# Patient Record
Sex: FEMALE | Race: BLACK OR AFRICAN AMERICAN | Employment: FULL TIME
[De-identification: names, ages, dates, MRNs, and addresses within clinical notes are randomized per-mention and may not be internally consistent; named-entity substitution may affect disease eponyms.]

---

## 2024-02-08 ENCOUNTER — HOSPITAL ENCOUNTER (EMERGENCY)
Facility: HOSPITAL | Age: 39
Discharge: HOME | End: 2024-02-08
Attending: EMERGENCY MEDICINE
Payer: OTHER GOVERNMENT

## 2024-02-08 VITALS
HEART RATE: 60 BPM | WEIGHT: 160 LBS | DIASTOLIC BLOOD PRESSURE: 65 MMHG | TEMPERATURE: 98.1 F | BODY MASS INDEX: 26.66 KG/M2 | RESPIRATION RATE: 20 BRPM | SYSTOLIC BLOOD PRESSURE: 116 MMHG | HEIGHT: 65 IN | OXYGEN SATURATION: 100 %

## 2024-02-08 DIAGNOSIS — R10.31 RIGHT LOWER QUADRANT ABDOMINAL PAIN: Primary | ICD-10-CM

## 2024-02-08 LAB
ALBUMIN SERPL-MCNC: 3.3 G/DL (ref 3.4–5)
ALBUMIN/GLOB SERPL: 0.8 (ref 0.8–1.7)
ALP SERPL-CCNC: 69 U/L (ref 45–117)
ALT SERPL-CCNC: 24 U/L (ref 13–56)
ANION GAP SERPL CALC-SCNC: 5 MMOL/L (ref 3–18)
APPEARANCE UR: ABNORMAL
AST SERPL-CCNC: 19 U/L (ref 10–38)
BASOPHILS # BLD: 0 K/UL (ref 0–0.1)
BASOPHILS NFR BLD: 0 % (ref 0–2)
BILIRUB SERPL-MCNC: 0.3 MG/DL (ref 0.2–1)
BILIRUB UR QL: NEGATIVE
BUN SERPL-MCNC: 5 MG/DL (ref 7–18)
BUN/CREAT SERPL: 8 (ref 12–20)
CALCIUM SERPL-MCNC: 8.3 MG/DL (ref 8.5–10.1)
CHLORIDE SERPL-SCNC: 108 MMOL/L (ref 100–111)
CO2 SERPL-SCNC: 22 MMOL/L (ref 21–32)
COLOR UR: YELLOW
CREAT SERPL-MCNC: 0.66 MG/DL (ref 0.6–1.3)
DIFFERENTIAL METHOD BLD: ABNORMAL
EOSINOPHIL # BLD: 0 K/UL (ref 0–0.4)
EOSINOPHIL NFR BLD: 0 % (ref 0–5)
ERYTHROCYTE [DISTWIDTH] IN BLOOD BY AUTOMATED COUNT: 17.9 % (ref 11.6–14.5)
GLOBULIN SER CALC-MCNC: 4.3 G/DL (ref 2–4)
GLUCOSE SERPL-MCNC: 120 MG/DL (ref 74–99)
GLUCOSE UR STRIP.AUTO-MCNC: NEGATIVE MG/DL
HCG SERPL QL: NEGATIVE
HCT VFR BLD AUTO: 30.1 % (ref 35–45)
HGB BLD-MCNC: 8.6 G/DL (ref 12–16)
HGB UR QL STRIP: NEGATIVE
IMM GRANULOCYTES # BLD AUTO: 0 K/UL (ref 0–0.04)
IMM GRANULOCYTES NFR BLD AUTO: 0 % (ref 0–0.5)
KETONES UR QL STRIP.AUTO: 15 MG/DL
LEUKOCYTE ESTERASE UR QL STRIP.AUTO: NEGATIVE
LIPASE SERPL-CCNC: 37 U/L (ref 13–75)
LYMPHOCYTES # BLD: 0.6 K/UL (ref 0.9–3.6)
LYMPHOCYTES NFR BLD: 7 % (ref 21–52)
MCH RBC QN AUTO: 18.9 PG (ref 24–34)
MCHC RBC AUTO-ENTMCNC: 28.6 G/DL (ref 31–37)
MCV RBC AUTO: 66.3 FL (ref 78–100)
MONOCYTES # BLD: 0.3 K/UL (ref 0.05–1.2)
MONOCYTES NFR BLD: 4 % (ref 3–10)
NEUTS SEG # BLD: 7.2 K/UL (ref 1.8–8)
NEUTS SEG NFR BLD: 89 % (ref 40–73)
NITRITE UR QL STRIP.AUTO: NEGATIVE
NRBC # BLD: 0 K/UL (ref 0–0.01)
NRBC BLD-RTO: 0 PER 100 WBC
PH UR STRIP: 7 (ref 5–8)
PLATELET # BLD AUTO: 278 K/UL (ref 135–420)
POTASSIUM SERPL-SCNC: 4 MMOL/L (ref 3.5–5.5)
PROT SERPL-MCNC: 7.6 G/DL (ref 6.4–8.2)
PROT UR STRIP-MCNC: NEGATIVE MG/DL
RBC # BLD AUTO: 4.54 M/UL (ref 4.2–5.3)
SODIUM SERPL-SCNC: 135 MMOL/L (ref 136–145)
SP GR UR REFRACTOMETRY: 1.02 (ref 1–1.03)
UROBILINOGEN UR QL STRIP.AUTO: 0.2 EU/DL (ref 0.2–1)
WBC # BLD AUTO: 8.1 K/UL (ref 4.6–13.2)

## 2024-02-08 PROCEDURE — 84703 CHORIONIC GONADOTROPIN ASSAY: CPT

## 2024-02-08 PROCEDURE — 99284 EMERGENCY DEPT VISIT MOD MDM: CPT

## 2024-02-08 PROCEDURE — 80048 BASIC METABOLIC PNL TOTAL CA: CPT

## 2024-02-08 PROCEDURE — 80076 HEPATIC FUNCTION PANEL: CPT

## 2024-02-08 PROCEDURE — 96375 TX/PRO/DX INJ NEW DRUG ADDON: CPT

## 2024-02-08 PROCEDURE — 81003 URINALYSIS AUTO W/O SCOPE: CPT

## 2024-02-08 PROCEDURE — 6360000002 HC RX W HCPCS: Performed by: EMERGENCY MEDICINE

## 2024-02-08 PROCEDURE — 96374 THER/PROPH/DIAG INJ IV PUSH: CPT

## 2024-02-08 PROCEDURE — 83690 ASSAY OF LIPASE: CPT

## 2024-02-08 PROCEDURE — 85025 COMPLETE CBC W/AUTO DIFF WBC: CPT

## 2024-02-08 RX ORDER — MORPHINE SULFATE 2 MG/ML
2 INJECTION, SOLUTION INTRAMUSCULAR; INTRAVENOUS
Status: COMPLETED | OUTPATIENT
Start: 2024-02-08 | End: 2024-02-08

## 2024-02-08 RX ORDER — FLUOXETINE HYDROCHLORIDE 20 MG/1
20 CAPSULE ORAL DAILY
COMMUNITY

## 2024-02-08 RX ORDER — TRAMADOL HYDROCHLORIDE 50 MG/1
50 TABLET ORAL EVERY 6 HOURS PRN
Qty: 12 TABLET | Refills: 0 | Status: SHIPPED | OUTPATIENT
Start: 2024-02-08 | End: 2024-02-13

## 2024-02-08 RX ORDER — DOCUSATE SODIUM 100 MG/1
100 CAPSULE, LIQUID FILLED ORAL 2 TIMES DAILY
Qty: 60 CAPSULE | Refills: 0 | Status: SHIPPED | OUTPATIENT
Start: 2024-02-08 | End: 2024-03-09

## 2024-02-08 RX ORDER — ONDANSETRON 2 MG/ML
4 INJECTION INTRAMUSCULAR; INTRAVENOUS
Status: COMPLETED | OUTPATIENT
Start: 2024-02-08 | End: 2024-02-08

## 2024-02-08 RX ORDER — KETOROLAC TROMETHAMINE 15 MG/ML
15 INJECTION, SOLUTION INTRAMUSCULAR; INTRAVENOUS EVERY 6 HOURS PRN
Status: DISCONTINUED | OUTPATIENT
Start: 2024-02-08 | End: 2024-02-09 | Stop reason: HOSPADM

## 2024-02-08 RX ADMIN — MORPHINE SULFATE 2 MG: 2 INJECTION, SOLUTION INTRAMUSCULAR; INTRAVENOUS at 20:57

## 2024-02-08 RX ADMIN — KETOROLAC TROMETHAMINE 15 MG: 15 INJECTION, SOLUTION INTRAMUSCULAR; INTRAVENOUS at 20:57

## 2024-02-08 RX ADMIN — ONDANSETRON 4 MG: 2 INJECTION INTRAMUSCULAR; INTRAVENOUS at 20:57

## 2024-02-08 ASSESSMENT — LIFESTYLE VARIABLES
HOW MANY STANDARD DRINKS CONTAINING ALCOHOL DO YOU HAVE ON A TYPICAL DAY: PATIENT DOES NOT DRINK
HOW OFTEN DO YOU HAVE A DRINK CONTAINING ALCOHOL: NEVER

## 2024-02-08 ASSESSMENT — ENCOUNTER SYMPTOMS
VOMITING: 1
ABDOMINAL PAIN: 1
RESPIRATORY NEGATIVE: 1
NAUSEA: 1

## 2024-02-08 ASSESSMENT — PAIN SCALES - GENERAL
PAINLEVEL_OUTOF10: 4
PAINLEVEL_OUTOF10: 8

## 2024-02-08 ASSESSMENT — PAIN - FUNCTIONAL ASSESSMENT: PAIN_FUNCTIONAL_ASSESSMENT: 0-10

## 2024-02-09 NOTE — ED PROVIDER NOTES
examination and evaluation of the patient abdomen was normal.  No pain with palpation of the abdomen.    Patient had repeat lab test done in ER which was completely normal.  Patient was treated with low-dose morphine and Toradol.    Upon re-evaluation the patient's symptoms have improved. Pt has non-toxic appearance and condition is stable for discharge. Patient was informed of tests & results, instructed to f/u with PCP and return to the ED upon worsening of symptoms. All questions and concerns were addressed.       Procedures    FINAL IMPRESSION      Abdominal pain of unknown etiology  Early constipation    DISPOSITION/PLAN     DISPOSITION  DC home.  Follow-up PCP in 2 to 3 days.  Return ER needed    DISCHARGE MEDICATIONS:    Colace    Continue Bentyl     PATIENT REFERRED TO:  GI Dr. Vizcaino    (Please note that portions of this note were completed with a voice recognitionprogram.  Efforts were made to edit the dictations but occasionally words are mis-transcribed.)    Ben Castro MD(electronically signed)  Attending Emergency Physician          Ben Castro MD  02/09/24 [id number]       Ben Castro MD  02/09/24 [id number]

## 2024-02-09 NOTE — ED TRIAGE NOTES
RLQ pain that radiates to right lower back since this am. Was at [facility] ED today and had labs/CT all negative. Was sent home on pain medication and pain never improved. +NV.

## 2024-02-09 NOTE — ED NOTES
Patient received discharge instructions, left ED in stable condition. Ambulatory, steady gait noted.

## 2024-12-27 ENCOUNTER — HOSPITAL ENCOUNTER (OUTPATIENT)
Facility: HOSPITAL | Age: 39
Setting detail: RECURRING SERIES
Discharge: HOME | End: 2024-12-30
Payer: OTHER GOVERNMENT

## 2024-12-27 PROCEDURE — 97110 THERAPEUTIC EXERCISES: CPT

## 2024-12-27 PROCEDURE — 97112 NEUROMUSCULAR REEDUCATION: CPT

## 2024-12-27 PROCEDURE — 97162 PT EVAL MOD COMPLEX 30 MIN: CPT

## 2024-12-27 NOTE — PROGRESS NOTES
[facility]  [address] [facility] [address] [place] [address] Ph:[phone] Fx: [phone]    PLAN OF CARE/ Statement of Necessity for Physical Therapy Services           Patient name: Jane Werner Start of Care: 2024   Referral source: Whitney Chapa MD : 1985    Medical Diagnosis: Pain in right hip [M25.551]  Pain in left hip [M25.552]       Onset Date: 2022   Treatment Diagnosis: M25.551  RIGHT HIP PAIN and M25.552  LEFT HIP PAIN                                      Prior Hospitalization: see medical history Provider#: [id number]     Comorbidities: Asthma    Prior Level of Function: The patient reports she had improved ease of sleeping, performing ADLs, as well as performing transfers from seated position.    The Plan of Care and following information is based on the information from the initial evaluation.    Assessment / key information:  The patient is a 39 year old female with a chief complaint of B hip pain with pain that does extend down into the back of her right thigh, calf and foot which is always there, but worsens when she is sitting for prolonged periods. The patient has had an MRI and radiographs sclerotic focus on the left iliac wing as well as right proximal hamstring tendon. No known images have been taken of her lumbar spine. The patient presents with slight positive slump testing of her right, though negative with SLR B. She demonstrates hypermobility with weakness of noted of her core and about her hips. Myotomes appear clear with a fairly clear neuro screen with exception of slight positive slump. The patient has impairments consisting of pain, decreased strength, decreased ease of ADLs, decreased stability, and decreased ease of transfers. The patient will benefit from skilled PT in order to address the aforementioned impairments.     Evaluation Complexity:  History:  MEDIUM  Complexity : 1-2 comorbidities / personal 
    Objective Information/Functional Measures/Assessment    See IE    Patient will continue to benefit from skilled PT / OT services to modify and progress therapeutic interventions, analyze and address functional mobility deficits, analyze and address ROM deficits, analyze and address strength deficits, analyze and address soft tissue restrictions, analyze and cue for proper movement patterns, analyze and modify for postural abnormalities, and instruct in home and community integration to address functional deficits and attain remaining goals.    Progress toward goals / Updated goals:  []  See Progress Note/Recertification  Short Term Goals: To be accomplished in 2 weeks  The patient will demonstrate independence and compliance with HEP to maximize therapeutic benefit.   IE: issued HEP  The patient will report 50% improvement in right leg radicular type symptoms in order to improve ease of ADLs.    IE: 0%  Long Term Goals: To be accomplished in 12 weeks  The patient will improve LEFS score to 72/80 in order to improve ease of ADLs.   IE: 58/80  The patient will improve will improve hip ABD/EXT strength to 5/5 MMT to maximize stability in stance.   IE: 4-/5 MMT B ABD, 4-/5 MMT EXT  The patient will improve prone plank for 30\" without lordosis in order to improve ease of ADLs.   IE: 12\"  The patient will report 75% improvement in order to improve quality of life.    IE: 0%    PLAN  Yes  Continue plan of care  []  Upgrade activities as tolerated  []  Discharge due to :  []  Other:    Adi Scott PT    12/27/2024    4:02 PM    Future Appointments   Date Time Provider Department Center   1/6/2025  2:30 PM Adi Scott PT [facility]   1/15/2025  3:10 PM Adi Scott PT [facility]   1/17/2025  1:50 PM Katie Ramos, PT [facility]   1/20/2025  2:30 PM Katie Raoms, PT [facility]   1/23/2025  1:50 PM Jyoti Wray, PT [facility]   1/27/2025  2:30 PM [place]

## 2025-01-06 ENCOUNTER — HOSPITAL ENCOUNTER (OUTPATIENT)
Facility: HOSPITAL | Age: 40
Setting detail: RECURRING SERIES
Discharge: HOME | End: 2025-01-09
Payer: OTHER GOVERNMENT

## 2025-01-06 PROCEDURE — 97530 THERAPEUTIC ACTIVITIES: CPT

## 2025-01-06 PROCEDURE — 97112 NEUROMUSCULAR REEDUCATION: CPT

## 2025-01-06 PROCEDURE — 97110 THERAPEUTIC EXERCISES: CPT

## 2025-01-06 NOTE — PROGRESS NOTES
PHYSICAL / OCCUPATIONAL THERAPY - DAILY TREATMENT NOTE     Patient Name: Jane Werner    Date: 2025    : 1985  Insurance: Payor: [employer] EAST / Plan:  EAST PRIME / Product Type: *No Product type* /      Patient  verified Yes     Visit #   Current / Total 2 24   Time   In / Out 231pm 314pm   Pain   In / Out 4 4   Subjective Functional Status/Changes: Pt state today is \"just one of the days\". States she is having wesley but has no particular reason why.    Changes to:  Allergies, Med Hx, Sx Hx?   no       TREATMENT AREA =  Pain in right hip [M25.551]  Pain in left hip [M25.552]    If an interpreting service is utilized for treatment of this patient, the contents of this document represent the material reviewed with the patient via the .     OBJECTIVE    Modalities Rationale:     pt declined    Therapeutic Procedures:  Tx Min Billable or 1:1 Min (if diff from Tx Min) Procedure, Rationale, Specifics   23  88150 Therapeutic Exercise (timed):  increase ROM, strength, coordination, balance, and proprioception to improve patient's ability to progress to PLOF and address remaining functional goals. (see flow sheet as applicable)    Details if applicable:       11  74265 Neuromuscular Re-Education (timed):  improve balance, coordination, kinesthetic sense, posture, core stability and proprioception to improve patient's ability to develop conscious control of individual muscles and awareness of position of extremities in order to progress to PLOF and address remaining functional goals. (see flow sheet as applicable)    Details if applicable:     9  88655 Therapeutic Activity (timed):  use of dynamic activities replicating functional movements to increase ROM, strength, coordination, balance, and proprioception in order to improve patient's ability to progress to PLOF and address remaining functional goals.  (see flow sheet as applicable)     Details if applicable:           Details if

## 2025-01-08 ENCOUNTER — TELEPHONE (OUTPATIENT)
Facility: HOSPITAL | Age: 40
End: 2025-01-08

## 2025-01-15 ENCOUNTER — HOSPITAL ENCOUNTER (OUTPATIENT)
Facility: HOSPITAL | Age: 40
Setting detail: RECURRING SERIES
Discharge: HOME | End: 2025-01-18
Payer: OTHER GOVERNMENT

## 2025-01-15 PROCEDURE — 97112 NEUROMUSCULAR REEDUCATION: CPT

## 2025-01-15 PROCEDURE — 97530 THERAPEUTIC ACTIVITIES: CPT

## 2025-01-15 PROCEDURE — 97110 THERAPEUTIC EXERCISES: CPT

## 2025-01-15 NOTE — PROGRESS NOTES
improve ease of ADLs.              IE: 12\"     Current 1/15/2025: Requires rest breaks during 30\" mountain climbers   The patient will report 75% improvement in order to improve quality of life.               IE: 0%     PLAN  Yes  Continue plan of care  []  Upgrade activities as tolerated  []  Discharge due to :  []  Other:    Adi Scott, ANTONINO    1/15/2025    3:39 PM    Future Appointments   Date Time Provider Department Center   1/17/2025  1:50 PM Katie Ramos, PT [facility]   1/20/2025  2:30 PM [facility]   1/23/2025  1:50 PM Jyoti Wray, PT [facility]   1/27/2025  2:30 PM Adi Scott, PT [facility]   1/29/2025  2:30 PM Adi Scott, PT [facility]

## 2025-01-17 ENCOUNTER — HOSPITAL ENCOUNTER (OUTPATIENT)
Facility: HOSPITAL | Age: 40
Setting detail: RECURRING SERIES
Discharge: HOME | End: 2025-01-20
Payer: OTHER GOVERNMENT

## 2025-01-17 PROCEDURE — 97112 NEUROMUSCULAR REEDUCATION: CPT

## 2025-01-17 PROCEDURE — 97530 THERAPEUTIC ACTIVITIES: CPT

## 2025-01-17 PROCEDURE — 97110 THERAPEUTIC EXERCISES: CPT

## 2025-01-17 NOTE — PROGRESS NOTES
PHYSICAL / OCCUPATIONAL THERAPY - DAILY TREATMENT NOTE     Patient Name: Jane Werner    Date: 2025    : 1985  Insurance: Payor: [employer] EAST / Plan:  EAST PRIME / Product Type: *No Product type* /      Patient  verified Yes     Visit #   Current / Total 4 24   Time   In / Out 150pm 233pm   Pain   In / Out 0 0   Subjective Functional Status/Changes: Pt states she does feel like things are getting better. Today is a good day. She has noticed she can sit longer than she used to   Changes to:  Allergies, Med Hx, Sx Hx?   no       TREATMENT AREA =  Pain in right hip [M25.551]  Pain in left hip [M25.552]    If an interpreting service is utilized for treatment of this patient, the contents of this document represent the material reviewed with the patient via the .     OBJECTIVE        Therapeutic Procedures:  Tx Min Billable or 1:1 Min (if diff from Tx Min) Procedure, Rationale, Specifics   23  05374 Therapeutic Exercise (timed):  increase ROM, strength, coordination, balance, and proprioception to improve patient's ability to progress to PLOF and address remaining functional goals. (see flow sheet as applicable)    Details if applicable:       12  33679 Neuromuscular Re-Education (timed):  improve balance, coordination, kinesthetic sense, posture, core stability and proprioception to improve patient's ability to develop conscious control of individual muscles and awareness of position of extremities in order to progress to PLOF and address remaining functional goals. (see flow sheet as applicable)    Details if applicable:     8  20581 Therapeutic Activity (timed):  use of dynamic activities replicating functional movements to increase ROM, strength, coordination, balance, and proprioception in order to improve patient's ability to progress to PLOF and address remaining functional goals.  (see flow sheet as applicable)     Details if applicable:           Details if applicable:

## 2025-01-20 ENCOUNTER — HOSPITAL ENCOUNTER (OUTPATIENT)
Facility: HOSPITAL | Age: 40
Setting detail: RECURRING SERIES
Discharge: HOME | End: 2025-01-23
Payer: OTHER GOVERNMENT

## 2025-01-20 PROCEDURE — 97112 NEUROMUSCULAR REEDUCATION: CPT

## 2025-01-20 PROCEDURE — 97110 THERAPEUTIC EXERCISES: CPT

## 2025-01-20 PROCEDURE — 97530 THERAPEUTIC ACTIVITIES: CPT

## 2025-01-20 NOTE — PROGRESS NOTES
plank for 30\" without lordosis in order to improve ease of ADLs.              IE: 12\"                           Current 1/15/2025: Requires rest breaks during 30\" mountain climbers   The patient will report 75% improvement in order to improve quality of life.               IE: 0%              Current 1/17/25: 50% improvement    PLAN  Yes  Continue plan of care  []  Upgrade activities as tolerated  []  Discharge due to :  []  Other:    Lico Espino, DANIEL    1/20/2025    2:37 PM    Future Appointments   Date Time Provider Department Center   1/23/2025  1:50 PM Jyoti Wray, [facility]   1/27/2025  2:30 PM Adi Scott, PT [facility]   1/29/2025  2:30 PM Adi Scott, PT [facility]

## 2025-01-27 ENCOUNTER — HOSPITAL ENCOUNTER (OUTPATIENT)
Facility: HOSPITAL | Age: 40
Setting detail: RECURRING SERIES
Discharge: HOME | End: 2025-01-30
Payer: OTHER GOVERNMENT

## 2025-01-27 PROCEDURE — 97112 NEUROMUSCULAR REEDUCATION: CPT

## 2025-01-27 PROCEDURE — 97530 THERAPEUTIC ACTIVITIES: CPT

## 2025-01-27 PROCEDURE — 97110 THERAPEUTIC EXERCISES: CPT

## 2025-01-27 NOTE — PROGRESS NOTES
PHYSICAL / OCCUPATIONAL THERAPY - DAILY TREATMENT NOTE     Patient Name: Jane Werner    Date: 2025    : 1985  Insurance: Payor:  EAST / Plan:  EAST PRIME / Product Type: *No Product type* /      Patient  verified Yes     Visit #   Current / Total 6 24   Time   In / Out 2:30 3:15   Pain   In / Out 2/10 2/10   Subjective Functional Status/Changes: The patient reports improvement concerning hamstring pain and overall pain to date.   Changes to:  Allergies, Med Hx, Sx Hx?   no       TREATMENT AREA =  Pain in right hip [M25.551]  Pain in left hip [M25.552]    If an interpreting service is utilized for treatment of this patient, the contents of this document represent the material reviewed with the patient via the .     OBJECTIVE  Therapeutic Procedures:  Tx Min Billable or 1:1 Min (if diff from Tx Min) Procedure, Rationale, Specifics   23  97775 Therapeutic Exercise (timed):  increase ROM, strength, coordination, balance, and proprioception to improve patient's ability to progress to PLOF and address remaining functional goals. (see flow sheet as applicable)    Details if applicable:       12  10551 Neuromuscular Re-Education (timed):  improve balance, coordination, kinesthetic sense, posture, core stability and proprioception to improve patient's ability to develop conscious control of individual muscles and awareness of position of extremities in order to progress to PLOF and address remaining functional goals. (see flow sheet as applicable)    Details if applicable:     10  67737 Therapeutic Activity (timed):  use of dynamic activities replicating functional movements to increase ROM, strength, coordination, balance, and proprioception in order to improve patient's ability to progress to PLOF and address remaining functional goals.  (see flow sheet as applicable)     Details if applicable:     45  [facility] Totals Reminder: bill using total billable min of TIMED therapeutic

## 2025-01-27 NOTE — PROGRESS NOTES
[facility]  [address] [facility] [address] [place] [address] - Ph: [phone]   Fx: [phone]    PHYSICAL THERAPY PROGRESS NOTE    Patient name: Jane Werner Start of Care: 2024   Referral source: Whitney Chapa MD : 1985               Medical Diagnosis: Pain in right hip [M25.551]  Pain in left hip [M25.552]        Onset Date: 2022   Treatment Diagnosis: M25.551  RIGHT HIP PAIN and M25.552  LEFT HIP PAIN                                      Prior Hospitalization: see medical history Provider#: [id number]      Comorbidities: Asthma     Prior Level of Function: The patient reports she had improved ease of sleeping, performing ADLs, as well as performing transfers from seated position.    Reporting Period: 2025-2025  Visits from Start of Care: 6    Missed Visits: 1    Goals/Measure of Progress: To be achieved in 8 weeks:    Progress toward goals / Updated goals:  []  See Progress Note/Recertification  Short Term Goals: To be accomplished in 2 weeks  The patient will demonstrate independence and compliance with HEP to maximize therapeutic benefit.              IE: issued HEP              PN: 2025: reports compliance with HEP  The patient will report 50% improvement in right leg radicular type symptoms in order to improve ease of ADLs.               IE: 0%              PN: 2025 Reports 50% improvement in Right LE radicular symptoms.     Long Term Goals: To be accomplished in 12 weeks  The patient will improve LEFS score to 72/80 in order to improve ease of ADLs.              IE: 58/80              PN 2025: Progressed to 69/80  The patient will improve will improve hip ABD/EXT strength to 5/5 MMT to maximize stability in stance.              IE: 4-/5 MMT B ABD, 4-/5 MMT EXT              PN: 2025: MMT hip abd Left 4+/5, Right 4/5, hip ext Left 4/5, Right 4/5.  The patient will improve prone plank for 30\" without

## 2025-02-04 ENCOUNTER — HOSPITAL ENCOUNTER (OUTPATIENT)
Facility: HOSPITAL | Age: 40
Setting detail: RECURRING SERIES
Discharge: HOME | End: 2025-02-07
Payer: OTHER GOVERNMENT

## 2025-02-04 PROCEDURE — 97530 THERAPEUTIC ACTIVITIES: CPT

## 2025-02-04 PROCEDURE — 97110 THERAPEUTIC EXERCISES: CPT

## 2025-02-04 PROCEDURE — 97112 NEUROMUSCULAR REEDUCATION: CPT

## 2025-02-04 NOTE — PROGRESS NOTES
PHYSICAL / OCCUPATIONAL THERAPY - DAILY TREATMENT NOTE     Patient Name: Jane Werner    Date: 2025    : 1985  Insurance: Payor:  EAST / Plan:  EAST PRIME / Product Type: *No Product type* /      Patient  verified Yes     Visit #   Current / Total 7 24   Time   In / Out 2:32 3:12   Pain   In / Out 2/10 0/10   Subjective Functional Status/Changes: Pt reports no new complaints    Changes to:  Allergies, Med Hx, Sx Hx?   no       TREATMENT AREA =  Pain in right hip [M25.551]  Pain in left hip [M25.552]    If an interpreting service is utilized for treatment of this patient, the contents of this document represent the material reviewed with the patient via the .     OBJECTIVE      Therapeutic Procedures:  Tx Min Billable or 1:1 Min (if diff from Tx Min) Procedure, Rationale, Specifics   20  13877 Therapeutic Exercise (timed):  increase ROM, strength, coordination, balance, and proprioception to improve patient's ability to progress to PLOF and address remaining functional goals. (see flow sheet as applicable)    Details if applicable:       10  23678 Neuromuscular Re-Education (timed):  improve balance, coordination, kinesthetic sense, posture, core stability and proprioception to improve patient's ability to develop conscious control of individual muscles and awareness of position of extremities in order to progress to PLOF and address remaining functional goals. (see flow sheet as applicable)    Details if applicable:     10  11831 Therapeutic Activity (timed):  use of dynamic activities replicating functional movements to increase ROM, strength, coordination, balance, and proprioception in order to improve patient's ability to progress to PLOF and address remaining functional goals.  (see flow sheet as applicable)     Details if applicable:     40  [facility] Totals Reminder: bill using total billable min of TIMED therapeutic procedures (example: do not include dry needle or estim

## 2025-02-10 ENCOUNTER — HOSPITAL ENCOUNTER (OUTPATIENT)
Facility: HOSPITAL | Age: 40
Setting detail: RECURRING SERIES
End: 2025-02-10
Payer: OTHER GOVERNMENT

## 2025-02-12 ENCOUNTER — HOSPITAL ENCOUNTER (OUTPATIENT)
Facility: HOSPITAL | Age: 40
Setting detail: RECURRING SERIES
Discharge: HOME | End: 2025-02-15
Payer: OTHER GOVERNMENT

## 2025-02-12 PROCEDURE — 97112 NEUROMUSCULAR REEDUCATION: CPT

## 2025-02-12 PROCEDURE — 97530 THERAPEUTIC ACTIVITIES: CPT

## 2025-02-12 PROCEDURE — 97110 THERAPEUTIC EXERCISES: CPT

## 2025-02-12 NOTE — PROGRESS NOTES
PHYSICAL / OCCUPATIONAL THERAPY - DAILY TREATMENT NOTE     Patient Name: Jane Werner    Date: 2025    : 1985  Insurance: Payor:  EAST / Plan:  EAST PRIME / Product Type: *No Product type* /      Patient  verified Yes     Visit #   Current / Total 8 24   Time   In / Out 2:34 3:10   Pain   In / Out 3/10 2/10   Subjective Functional Status/Changes: The patient reports that her right hamstring and left front of her hip have been sore.   Changes to:  Allergies, Med Hx, Sx Hx?   no       TREATMENT AREA =  Pain in right hip [M25.551]  Pain in left hip [M25.552]    If an interpreting service is utilized for treatment of this patient, the contents of this document represent the material reviewed with the patient via the .     OBJECTIVE  Therapeutic Procedures:  Tx Min Billable or 1:1 Min (if diff from Tx Min) Procedure, Rationale, Specifics   14  60364 Therapeutic Exercise (timed):  increase ROM, strength, coordination, balance, and proprioception to improve patient's ability to progress to PLOF and address remaining functional goals. (see flow sheet as applicable)    Details if applicable:       12  60903 Neuromuscular Re-Education (timed):  improve balance, coordination, kinesthetic sense, posture, core stability and proprioception to improve patient's ability to develop conscious control of individual muscles and awareness of position of extremities in order to progress to PLOF and address remaining functional goals. (see flow sheet as applicable)    Details if applicable:     10  22051 Therapeutic Activity (timed):  use of dynamic activities replicating functional movements to increase ROM, strength, coordination, balance, and proprioception in order to improve patient's ability to progress to PLOF and address remaining functional goals.  (see flow sheet as applicable)     Details if applicable:     36  [facility] Totals Reminder: bill using total billable min of TIMED therapeutic

## 2025-02-17 ENCOUNTER — TELEPHONE (OUTPATIENT)
Facility: HOSPITAL | Age: 40
End: 2025-02-17

## 2025-02-18 ENCOUNTER — TELEPHONE (OUTPATIENT)
Facility: HOSPITAL | Age: 40
End: 2025-02-18

## 2025-02-19 ENCOUNTER — APPOINTMENT (OUTPATIENT)
Facility: HOSPITAL | Age: 40
End: 2025-02-19
Payer: OTHER GOVERNMENT

## 2025-02-24 ENCOUNTER — APPOINTMENT (OUTPATIENT)
Facility: HOSPITAL | Age: 40
End: 2025-02-24
Payer: OTHER GOVERNMENT

## 2025-02-26 ENCOUNTER — APPOINTMENT (OUTPATIENT)
Facility: HOSPITAL | Age: 40
End: 2025-02-26
Payer: OTHER GOVERNMENT